# Patient Record
Sex: FEMALE | Race: BLACK OR AFRICAN AMERICAN | Employment: UNEMPLOYED | URBAN - METROPOLITAN AREA
[De-identification: names, ages, dates, MRNs, and addresses within clinical notes are randomized per-mention and may not be internally consistent; named-entity substitution may affect disease eponyms.]

---

## 2019-06-26 ENCOUNTER — HOSPITAL ENCOUNTER (EMERGENCY)
Age: 29
Discharge: HOME | End: 2019-06-26
Payer: COMMERCIAL

## 2019-06-26 VITALS
TEMPERATURE: 99.2 F | BODY MASS INDEX: 40.83 KG/M2 | DIASTOLIC BLOOD PRESSURE: 79 MMHG | SYSTOLIC BLOOD PRESSURE: 133 MMHG | RESPIRATION RATE: 16 BRPM | HEART RATE: 97 BPM | WEIGHT: 245.37 LBS | OXYGEN SATURATION: 100 %

## 2019-06-26 DIAGNOSIS — M54.32 SCIATICA OF LEFT SIDE: ICD-10-CM

## 2019-06-26 DIAGNOSIS — J02.9 ACUTE PHARYNGITIS, UNSPECIFIED ETIOLOGY: Primary | ICD-10-CM

## 2019-06-26 PROCEDURE — 6370000000 HC RX 637 (ALT 250 FOR IP): Performed by: PHYSICIAN ASSISTANT

## 2019-06-26 PROCEDURE — 6360000002 HC RX W HCPCS: Performed by: PHYSICIAN ASSISTANT

## 2019-06-26 PROCEDURE — 99283 EMERGENCY DEPT VISIT LOW MDM: CPT

## 2019-06-26 RX ORDER — HYDROCODONE BITARTRATE AND ACETAMINOPHEN 5; 325 MG/1; MG/1
1 TABLET ORAL EVERY 6 HOURS PRN
Qty: 10 TABLET | Refills: 0 | Status: SHIPPED | OUTPATIENT
Start: 2019-06-26 | End: 2019-06-29

## 2019-06-26 RX ORDER — NAPROXEN 500 MG/1
500 TABLET ORAL 2 TIMES DAILY PRN
Qty: 30 TABLET | Refills: 0 | Status: SHIPPED | OUTPATIENT
Start: 2019-06-26

## 2019-06-26 RX ORDER — CYCLOBENZAPRINE HCL 10 MG
10 TABLET ORAL 3 TIMES DAILY PRN
Qty: 20 TABLET | Refills: 0 | Status: SHIPPED | OUTPATIENT
Start: 2019-06-26

## 2019-06-26 RX ORDER — AMOXICILLIN 500 MG/1
500 CAPSULE ORAL 2 TIMES DAILY
Qty: 20 CAPSULE | Refills: 0 | Status: SHIPPED | OUTPATIENT
Start: 2019-06-26 | End: 2019-07-06

## 2019-06-26 RX ORDER — HYDROCODONE BITARTRATE AND ACETAMINOPHEN 5; 325 MG/1; MG/1
1 TABLET ORAL ONCE
Status: COMPLETED | OUTPATIENT
Start: 2019-06-26 | End: 2019-06-26

## 2019-06-26 RX ORDER — CYCLOBENZAPRINE HCL 10 MG
10 TABLET ORAL ONCE
Status: COMPLETED | OUTPATIENT
Start: 2019-06-26 | End: 2019-06-26

## 2019-06-26 RX ORDER — DEXAMETHASONE 4 MG/1
8 TABLET ORAL ONCE
Status: COMPLETED | OUTPATIENT
Start: 2019-06-26 | End: 2019-06-26

## 2019-06-26 RX ADMIN — HYDROCODONE BITARTRATE AND ACETAMINOPHEN 1 TABLET: 5; 325 TABLET ORAL at 18:58

## 2019-06-26 RX ADMIN — DEXAMETHASONE 8 MG: 4 TABLET ORAL at 18:58

## 2019-06-26 RX ADMIN — CYCLOBENZAPRINE HYDROCHLORIDE 10 MG: 10 TABLET, FILM COATED ORAL at 18:58

## 2019-06-26 ASSESSMENT — PAIN DESCRIPTION - FREQUENCY: FREQUENCY: CONTINUOUS

## 2019-06-26 ASSESSMENT — ENCOUNTER SYMPTOMS
VOMITING: 1
SORE THROAT: 1
NAUSEA: 1
COUGH: 0
BACK PAIN: 1

## 2019-06-26 ASSESSMENT — PAIN DESCRIPTION - PAIN TYPE: TYPE: ACUTE PAIN

## 2019-06-26 ASSESSMENT — PAIN SCALES - GENERAL
PAINLEVEL_OUTOF10: 10
PAINLEVEL_OUTOF10: 10

## 2019-06-26 ASSESSMENT — PAIN DESCRIPTION - LOCATION: LOCATION: LEG;THROAT

## 2019-06-26 ASSESSMENT — PAIN DESCRIPTION - ONSET: ONSET: ON-GOING

## 2019-06-26 ASSESSMENT — PAIN DESCRIPTION - DESCRIPTORS: DESCRIPTORS: ACHING

## 2019-06-26 ASSESSMENT — PAIN DESCRIPTION - ORIENTATION: ORIENTATION: RIGHT;LEFT

## 2019-06-26 NOTE — ED PROVIDER NOTES
[address] [facility]        Pt Name: Jorge L Schulte  MRN: [id number]  [place] 1990  Date of evaluation: 6/26/2019  Provider: PAPO Colvin    This patient was not seen and evaluated by the attending physician No att. providers found. CHIEF COMPLAINT       Chief Complaint   Patient presents with    Pharyngitis     sore throat that started today. bilateral intermittent muscle cramps in legs for two months    Muscle Pain         HISTORY OF PRESENT ILLNESS  (Location/Symptom, Timing/Onset, Context/Setting, Quality,Duration, Modifying Factors, Severity.)   Jorge L Schulte is a 30 y.o. female who presents tot emergency department for sore throat that started today and pain in her entire left leg that has been intermittent for 1 month. Reports leg pain is throughout her whole leg and has been worse for the past 3 days and been constant. Reports she has had similar pain throughout the right leg but has not been as bad as it is now on the left. She also reports some back pain. Denies bowel or bladder incontinence, saddle anesthesia, urinary retention, IVDU. Reports nausea and vomited once today. Denies cough congestion. Reports chills but denies fever. Nursing Notes were reviewed and I agree. REVIEW OF SYSTEMS    (2-9 systems for level 4, 10 or more for level 5)     Review of Systems   Constitutional: Positive for chills. Negative for fever. HENT: Positive for sore throat. Negative for congestion. Respiratory: Negative for cough. Gastrointestinal: Positive for nausea and vomiting. Musculoskeletal: Positive for back pain and myalgias. Neurological:        Negative for bowel bladder incontinence, saddle anesthesia, IV drug use, urinary retention     Except as noted above the remainder of the review of systems was reviewed and negative. PAST MEDICAL HISTORY   No past medical history on file.     SURGICAL HISTORY           Procedure Laterality Date    TOE SURGERY Left     Big toe       CURRENT MEDICATIONS       Discharge Medication List as of 6/26/2019  7:08 PM      CONTINUE these medications which have NOT CHANGED    Details   gabapentin (NEURONTIN) 100 MG capsule Take 1 capsule by mouth 3 times daily, Disp-90 capsule, R-0             ALLERGIES     Patient has no known allergies. FAMILY HISTORY     No family history on file. No family status information on file. SOCIAL HISTORY      reports that she has never smoked. She does not have any smokeless tobacco history on file. She reports that she does not drink alcohol or use drugs. PHYSICAL EXAM    (up to 7 for level 4, 8 or more for level 5)     ED Triage Vitals [06/26/19 1713]   BP Temp Temp Source Pulse Resp SpO2 Height Weight   133/79 99.2 °F (37.3 °C) Temporal 97 16 100 % -- 245 lb 6 oz (111.3 kg)       Physical Exam   Constitutional: She is oriented to person, place, and time. She appears well-developed and well-nourished. No distress. HENT:   Head: Normocephalic and atraumatic. Nose: Nose normal.   Mouth/Throat: Oropharyngeal exudate present. The left posterior tonsil is erythematous, covered with white exudate mildly enlarged. The right posterior tonsil has minimal edema but mild erythema. Uvula midline. No fullness of the soft palate. No peritonsillar abscess. No difficulty swallowing or handling secretions. No signs or airway compromise. Eyes: EOM are normal.   Neck: Normal range of motion. Neck supple. Abdominal: Soft. She exhibits no distension and no mass. There is no tenderness. There is no rebound and no guarding. Musculoskeletal: Normal range of motion. No Tenderness palpation lumbar midline. Mild tenderness palpation of the right lumbar paraspinal area. No rash. Mild TTP of the entire left leg. Mild TTP of the left upper leg circumferentially. No calf erythema or edema.   Lower leg appears normal Lymphadenopathy:     She has cervical adenopathy. Neurological: She is alert and oriented to person, place, and time. DP pulse 2+ bilaterally. Normal sensation of all 3 dermatomes of the feet bilaterally. Achilles DTRs 2+ bilaterally. 5/5 strength inversion eversion plantar flexion dorsiflexion bilateral feet. Skin: Skin is warm and dry. She is not diaphoretic. Psychiatric: She has a normal mood and affect. Her behavior is normal. Judgment and thought content normal.       DIFFERENTIAL DIAGNOSIS   Cauda equina, spinal stenosis, strain, disk herniation, epidural abscess, pylonephritis,Kidney stone, epidural hematoma      DIAGNOSTIC RESULTS       RADIOLOGY:   Non-plain film images such as CT, Ultrasound and MRI are read by the radiologist.Plain radiographic images are visualized and preliminarily interpreted by PAPO Sheikh with the below findings:      Interpretation per theRadiologist below, if available at the time of this note:    No orders to display         LABS:  No results found for this visit on 06/26/19. All other labs were within normal range or not returned as of thisdictation. EMERGENCYDEPARTMENT COURSE and DIFFERENTIAL DIAGNOSIS/MDM:   Vitals:    Vitals:    06/26/19 1713   BP: 133/79   Pulse: 97   Resp: 16   Temp: 99.2 °F (37.3 °C)   TempSrc: Temporal   SpO2: 100%   Weight: 245 lb 6 oz (111.3 kg)       Patient was nontoxic, well appearing, afebrile with normal vital signs. She denies bowel bladder incontinence, saddle anesthesia, IV drug use, urinary retention. Suspect this is sciatica. Has pain in her lower back and pain throughout her entire left leg. Will treat with naproxen, Flexeril, Norco.  Also appears to have strep throat. No peritonsillar abscess. Will treat with amoxicillin and also Decadron here. Instructed to follow-up with primary care doctor next few days for reevaluation return for worsening. She agreed and understood.       I estimate there is LOW risk for ABDOMINAL AORTIC ANEURYSM, CAUDA EQUINA SYNDROME, EPIDURAL MASS LESION, OR CORD COMPRESSION, thus I consider the discharge disposition reasonable. PROCEDURES:  None    FINAL IMPRESSION      1. Acute pharyngitis, unspecified etiology    2. Sciatica of left side          DISPOSITION/PLAN   DISPOSITIONDecision To Discharge 06/26/2019 06:32:05 PM      PATIENT REFERRED TO:  [facility] Phys[facility] Pre-Service  [phone]  Schedule an appointment as soon as possible for a visit in 2 days  for reevaluation    [facility] Emergency Department  [address] [place] [address]  [phone]    As needed, If symptoms worsen, for worsening pain, or if numbness tingling loss of bowel or bladder control or unable to urinate or for any other concerns    [facility]) Pre-Services  [phone]          DISCHARGE MEDICATIONS:  Discharge Medication List as of 6/26/2019  7:08 PM      START taking these medications    Details   naproxen (NAPROSYN) 500 MG tablet Take 1 tablet by mouth 2 times daily as needed for Pain, Disp-30 tablet, R-0Print      cyclobenzaprine (FLEXERIL) 10 MG tablet Take 1 tablet by mouth 3 times daily as needed for Muscle spasms, Disp-20 tablet, R-0Print      HYDROcodone-acetaminophen (NORCO) 5-325 MG per tablet Take 1 tablet by mouth every 6 hours as needed for Pain for up to 3 days. , Disp-10 tablet, R-0Print      amoxicillin (AMOXIL) 500 MG capsule Take 1 capsule by mouth 2 times daily for 10 days, Disp-20 capsule, R-0Print             (Please note that portions of this note were completed with a voice recognition program.  Efforts were [facility] edit the dictations but occasionally words are mis-transcribed.)    Misha Ibarra, [address] [place] [address]  06/26/19 2340